# Patient Record
Sex: FEMALE | Race: BLACK OR AFRICAN AMERICAN | NOT HISPANIC OR LATINO | Employment: FULL TIME | URBAN - METROPOLITAN AREA
[De-identification: names, ages, dates, MRNs, and addresses within clinical notes are randomized per-mention and may not be internally consistent; named-entity substitution may affect disease eponyms.]

---

## 2019-08-15 ENCOUNTER — OFFICE VISIT (OUTPATIENT)
Dept: FAMILY MEDICINE CLINIC | Facility: CLINIC | Age: 49
End: 2019-08-15

## 2019-08-15 ENCOUNTER — APPOINTMENT (OUTPATIENT)
Dept: GENERAL RADIOLOGY | Facility: HOSPITAL | Age: 49
End: 2019-08-15

## 2019-08-15 VITALS
WEIGHT: 132 LBS | SYSTOLIC BLOOD PRESSURE: 100 MMHG | HEIGHT: 62 IN | OXYGEN SATURATION: 98 % | HEART RATE: 75 BPM | BODY MASS INDEX: 24.29 KG/M2 | DIASTOLIC BLOOD PRESSURE: 72 MMHG

## 2019-08-15 DIAGNOSIS — M54.50 ACUTE MIDLINE LOW BACK PAIN WITHOUT SCIATICA: Primary | ICD-10-CM

## 2019-08-15 DIAGNOSIS — Z00.00 PREVENTATIVE HEALTH CARE: ICD-10-CM

## 2019-08-15 DIAGNOSIS — S39.012A LUMBAR STRAIN, INITIAL ENCOUNTER: ICD-10-CM

## 2019-08-15 PROCEDURE — 99203 OFFICE O/P NEW LOW 30 MIN: CPT | Performed by: FAMILY MEDICINE

## 2019-08-15 RX ORDER — CYCLOBENZAPRINE HCL 10 MG
10 TABLET ORAL 3 TIMES DAILY PRN
Qty: 30 TABLET | Refills: 0 | Status: SHIPPED | OUTPATIENT
Start: 2019-08-15 | End: 2019-11-25

## 2019-08-15 NOTE — PROGRESS NOTES
Subjective   Flora Love is a 48 y.o. female.     Chief Complaint   Patient presents with   • Establish Care     back pain        History of Present Illness     Previous primary care: [place]    Chronic health conditions:  Chronic intermittent low back pain: She had scoliosis surgery back in 1985.  She periodically has issues due to this    Other physicians currently involved in patient's care:  She sees her gynecologist once per year, last Pap smear 1 year ago    Acute complaints:  Flora Love is a 48 y.o. female who presents today to [facility].  She was seen in the urgent care on 8/13/2019 for acute low back pain.  She was given a prescription for Flexeril and received an injection of steroid and the peroneal muscle that time.  She reports that she has improved significantly since that time.  She still has some pain, it is more located in the midline.    This patient is accompanied by their self who contributes to the history of their care.    The following portions of the patient's history were reviewed and updated as appropriate: allergies, current medications, past family history, past medical history, past social history, past surgical history and problem list.    Active Ambulatory Problems     Diagnosis Date Noted   • No Active Ambulatory Problems     Resolved Ambulatory Problems     Diagnosis Date Noted   • No Resolved Ambulatory Problems     Past Medical History:   Diagnosis Date   • Arthritis    • Scoliosis      Past Surgical History:   Procedure Laterality Date   • BACK SURGERY       Family History   Problem Relation Age of Onset   • Heart attack Mother    • Arthritis Father    • Hypertension Father    • Hyperlipidemia Father    • Stroke Father      Social History     Socioeconomic History   • Marital status: Single     Spouse name: Not on file   • Number of children: Not on file   • Years of education: Not on file   • Highest education level: Not on file   Tobacco Use   • Smoking  "status: Never Smoker   • Smokeless tobacco: Never Used   Substance and Sexual Activity   • Alcohol use: No     Frequency: Never   • Drug use: No         Review of Systems   Constitutional: Negative.    HENT: Negative.    Eyes: Negative.    Respiratory: Negative for cough, chest tightness, shortness of breath and wheezing.    Cardiovascular: Negative for chest pain and palpitations.   Gastrointestinal: Negative for abdominal distention, abdominal pain, constipation, diarrhea, nausea and vomiting.   Musculoskeletal: Positive for back pain. Negative for gait problem and joint swelling.   Skin: Negative for color change and wound.   Psychiatric/Behavioral: Negative for agitation and hallucinations.       Objective   Blood pressure 100/72, pulse 75, height 157.5 cm (62.01\"), weight 59.9 kg (132 lb), last menstrual period 08/06/2019, SpO2 98 %, not currently breastfeeding.  Nursing note reviewed  Physical Exam  Const: NAD, A&Ox4, Pleasant, Cooperative  Eyes: EOMI, no conjunctivitis  ENT: No nasal discharge present, neck supple  Cardiac: Regular rate and rhythm, no cyanosis  Resp: Respiratory rate within normal limits, no increased work of breathing, no audible wheezing or retractions noted  GI: No distention or ascites  MSK: Motor and sensation grossly intact in bilateral upper extremities.  There is very mild midline spinal tenderness at about L3.  There is no paraspinal muscle spasm or tenderness, no edema noted today.  Straight leg raise negative.  Reflexes brisk and symmetric.  Neurologic: CN II-XII grossly intact  Psych: Appropriate mood and behavior  Procedures  Assessment/Plan   Problem List Items Addressed This Visit     None      Visit Diagnoses     Acute midline low back pain without sciatica    -  Primary    Relevant Orders    XR Spine Lumbar 2 or 3 View    Ambulatory Referral to Physical Therapy Evaluate and treat    Lumbar strain, initial encounter        Relevant Medications    cyclobenzaprine (FLEXERIL) 10 " MG tablet    Preventative health care        Relevant Orders    Comprehensive Metabolic Panel    CBC & Differential    Hemoglobin A1c    Lipid Panel    Urinalysis With Microscopic If Indicated (No Culture) - Urine, Clean Catch    TSH      #1 acute on chronic low back pain  I like to get an x-ray today for a baseline  She should continue to do stretching and exercises at home, she states that she is not sure what she is able to do  Referral to physical therapy placed  Continue Flexeril as needed over the next couple of weeks    We will plan to obtain previous records both for chronic preventative care as well as those related to the current episode of care.  Any records that the patient brought with her today were reviewed personally by me during the visit today and will be scanned into the chart for posterity.    There are no Patient Instructions on file for this visit.    Return in about 3 months (around 11/15/2019) for Annual.    Ambulatory progress note signed and attested to by Eddie Marley D.O.

## 2019-11-25 ENCOUNTER — OFFICE VISIT (OUTPATIENT)
Dept: FAMILY MEDICINE CLINIC | Facility: CLINIC | Age: 49
End: 2019-11-25

## 2019-11-25 VITALS
SYSTOLIC BLOOD PRESSURE: 122 MMHG | DIASTOLIC BLOOD PRESSURE: 80 MMHG | HEIGHT: 62 IN | HEART RATE: 75 BPM | OXYGEN SATURATION: 98 % | WEIGHT: 140 LBS | BODY MASS INDEX: 25.76 KG/M2

## 2019-11-25 DIAGNOSIS — M25.50 POLYARTHRALGIA: ICD-10-CM

## 2019-11-25 DIAGNOSIS — Z00.00 PREVENTATIVE HEALTH CARE: Primary | ICD-10-CM

## 2019-11-25 PROCEDURE — 99396 PREV VISIT EST AGE 40-64: CPT | Performed by: FAMILY MEDICINE

## 2019-11-25 NOTE — PROGRESS NOTES
"Subjective   Flora Love is a 48 y.o. female.     Chief Complaint   Patient presents with   • Annual Exam       History of Present Illness     Flora Love presents today for   Chief Complaint   Patient presents with   • Annual Exam     She received her previous primary care in [place].  She established care here in August.  At that time she was having low back pain, which did well with Flexeril.  X-ray was ordered for baseline, but was not completed.  She was instructed to come back today for her annual exam, and lab orders were placed to be done prior to her appointment.  These have not been completed.  In terms of her chronic preventative care, she does see her gynecologist every year for a pelvic exam, she had a Pap smear completed last year.  She is generally healthy and feels well, she occasionally has arthralgias of multiple joints.  She had an episode of gout that was diagnosed a few years ago, she questions this because she does not \"have anything that would make her have gout.\"    This patient is accompanied by their self who contributes to the history of their care.    The following portions of the patient's history were reviewed and updated as appropriate: allergies, current medications, past family history, past medical history, past social history, past surgical history and problem list.    Active Ambulatory Problems     Diagnosis Date Noted   • Preventative health care 11/25/2019     Resolved Ambulatory Problems     Diagnosis Date Noted   • No Resolved Ambulatory Problems     Past Medical History:   Diagnosis Date   • Arthritis    • Scoliosis      Past Surgical History:   Procedure Laterality Date   • BACK SURGERY       Family History   Problem Relation Age of Onset   • Heart attack Mother    • Arthritis Father    • Hypertension Father    • Hyperlipidemia Father    • Stroke Father      Social History     Socioeconomic History   • Marital status: Single     Spouse name: Not on file   • " "Number of children: Not on file   • Years of education: Not on file   • Highest education level: Not on file   Tobacco Use   • Smoking status: Never Smoker   • Smokeless tobacco: Never Used   Substance and Sexual Activity   • Alcohol use: No     Frequency: Never   • Drug use: No       Review of Systems  Review of Systems -  General ROS: negative for - chills, fever or night sweats  Cardiovascular ROS: no chest pain or dyspnea on exertion  Gastrointestinal ROS: no abdominal pain, change in bowel habits, or black or bloody stools  Genito-Urinary ROS: no dysuria, trouble voiding, or hematuria  MSK ROS: Mild polyarthralgia    Objective   Blood pressure 122/80, pulse 75, height 157.5 cm (62.01\"), weight 63.5 kg (140 lb), SpO2 98 %, not currently breastfeeding.  Nursing note reviewed  Physical Exam  Const: NAD, A&Ox4, Pleasant, Cooperative  Eyes: EOMI, no conjunctivitis  ENT: No nasal discharge present, neck supple  Cardiac: Regular rate and rhythm, no cyanosis  Resp: Respiratory rate within normal limits, no increased work of breathing, no audible wheezing or retractions noted  GI: No distention or ascites  MSK: Motor and sensation grossly intact in bilateral upper extremities.  Reflexes brisk and symmetric.  No arthritis of elbows, shoulders, knees, hands.  Neurologic: CN II-XII grossly intact  Psych: Appropriate mood and behavior.  Skin: Warm, dry  Procedures  Assessment/Plan   Problem List Items Addressed This Visit        Other    Preventative health care - Primary    Overview     Health maintenance:  Hypertension screening: Normotensive today  Lipid screening: Labs ordered  Diabetic screening: Previously diagnosed per patient, but she is not so sure that she actually has diabetes.  Labs ordered today.  Colonoscopy: Normal risk, should start after age 50 every 10 years.  Smoking cessation: Never a smoker  Depression screening: PHQ 2-  Pap smear: Completed last year at her gynecologist  Osteoporosis screening: Normal " risk, not currently indicated  Fall Risk Screening:   Vaccinations: See chart, up to date           Other Visit Diagnoses     Polyarthralgia        Relevant Orders    C-reactive Protein    Uric Acid        See patient diagnoses and orders along with patient instructions for assessment, plan, and changes to care for patient.    The preventative exam has been reviewed in detail.  The patient has been fully counseled on preventative guidelines for vaccines, cancer screenings, and other health maintenance needs. The patient was counseled on maintaining a lifestyle to promote good health and to minimize chronic diseases.  The patient has been assisted with scheduling healthcare procedures for the coming year and given a written document outlining these recommendations. Age-appropriate screening measures have been ordered for the patient today as indicated above.    Patient Instructions   1.  Continue medications and supplements - as listed.    2.  Continue well-balanced diet - low in calories and low in added sugar.  -Plant-based diets have the best evidence for decreasing inflammation and chronic pain, as well as reducing the risk of heart disease and dementia.    3.  Maintain a routine exercise program - 30 minutes at least 3 days every week.  This is important even if you are active at your job.    4.  A letter will be sent with your test results or they will be made available via [employer].  If you have not heard about your results within 10 days for chronic and routine labs, or 48 hours for acute labs, please call the office.    5.  If you would like to have your labs completed prior to your next visit to be discussed at your appointment, please call the office 1 to 2 weeks before your scheduled visit to request that lab orders be placed.      Return in about 1 year (around 11/25/2020) for (fasting blood work 1 week prior to appt).    Ambulatory progress note signed and attested to by Eddie Marley D.O.

## 2019-11-25 NOTE — PATIENT INSTRUCTIONS
1.  Continue medications and supplements - as listed.    2.  Continue well-balanced diet - low in calories and low in added sugar.  -Plant-based diets have the best evidence for decreasing inflammation and chronic pain, as well as reducing the risk of heart disease and dementia.    3.  Maintain a routine exercise program - 30 minutes at least 3 days every week.  This is important even if you are active at your job.    4.  A letter will be sent with your test results or they will be made available via [employer].  If you have not heard about your results within 10 days for chronic and routine labs, or 48 hours for acute labs, please call the office.    5.  If you would like to have your labs completed prior to your next visit to be discussed at your appointment, please call the office 1 to 2 weeks before your scheduled visit to request that lab orders be placed.

## 2019-12-02 ENCOUNTER — LAB (OUTPATIENT)
Dept: LAB | Facility: HOSPITAL | Age: 49
End: 2019-12-02

## 2019-12-02 DIAGNOSIS — Z00.00 PREVENTATIVE HEALTH CARE: ICD-10-CM

## 2019-12-02 DIAGNOSIS — M25.50 POLYARTHRALGIA: ICD-10-CM

## 2019-12-02 LAB
BILIRUB UR QL STRIP: NEGATIVE
CLARITY UR: CLEAR
COLOR UR: YELLOW
GLUCOSE UR STRIP-MCNC: NEGATIVE MG/DL
HBA1C MFR BLD: 6.3 % (ref 4.8–5.6)
HGB UR QL STRIP.AUTO: NEGATIVE
KETONES UR QL STRIP: ABNORMAL
LEUKOCYTE ESTERASE UR QL STRIP.AUTO: NEGATIVE
NITRITE UR QL STRIP: NEGATIVE
PH UR STRIP.AUTO: 5.5 [PH] (ref 5–8)
PROT UR QL STRIP: NEGATIVE
SP GR UR STRIP: 1.02 (ref 1–1.03)
UROBILINOGEN UR QL STRIP: ABNORMAL

## 2019-12-02 PROCEDURE — 81003 URINALYSIS AUTO W/O SCOPE: CPT

## 2019-12-02 PROCEDURE — 83036 HEMOGLOBIN GLYCOSYLATED A1C: CPT

## 2019-12-02 PROCEDURE — 86140 C-REACTIVE PROTEIN: CPT

## 2019-12-02 PROCEDURE — 85025 COMPLETE CBC W/AUTO DIFF WBC: CPT

## 2019-12-02 PROCEDURE — 84550 ASSAY OF BLOOD/URIC ACID: CPT

## 2019-12-02 PROCEDURE — 80053 COMPREHEN METABOLIC PANEL: CPT

## 2019-12-02 PROCEDURE — 84443 ASSAY THYROID STIM HORMONE: CPT

## 2019-12-02 PROCEDURE — 80061 LIPID PANEL: CPT

## 2019-12-03 LAB
ALBUMIN SERPL-MCNC: 4.7 G/DL (ref 3.5–5.2)
ALBUMIN/GLOB SERPL: 1.3 G/DL
ALP SERPL-CCNC: 58 U/L (ref 39–117)
ALT SERPL W P-5'-P-CCNC: 17 U/L (ref 1–33)
ANION GAP SERPL CALCULATED.3IONS-SCNC: 11.7 MMOL/L (ref 5–15)
AST SERPL-CCNC: 19 U/L (ref 1–32)
BASOPHILS # BLD AUTO: 0.01 10*3/MM3 (ref 0–0.2)
BASOPHILS NFR BLD AUTO: 0.1 % (ref 0–1.5)
BILIRUB SERPL-MCNC: 1 MG/DL (ref 0.2–1.2)
BUN BLD-MCNC: 10 MG/DL (ref 6–20)
BUN/CREAT SERPL: 16.4 (ref 7–25)
CALCIUM SPEC-SCNC: 9.4 MG/DL (ref 8.6–10.5)
CHLORIDE SERPL-SCNC: 100 MMOL/L (ref 98–107)
CHOLEST SERPL-MCNC: 119 MG/DL (ref 0–200)
CO2 SERPL-SCNC: 27.3 MMOL/L (ref 22–29)
CREAT BLD-MCNC: 0.61 MG/DL (ref 0.57–1)
CRP SERPL-MCNC: 0.5 MG/DL (ref 0–0.5)
DEPRECATED RDW RBC AUTO: 44.3 FL (ref 37–54)
EOSINOPHIL # BLD AUTO: 0.2 10*3/MM3 (ref 0–0.4)
EOSINOPHIL NFR BLD AUTO: 2.2 % (ref 0.3–6.2)
ERYTHROCYTE [DISTWIDTH] IN BLOOD BY AUTOMATED COUNT: 13 % (ref 12.3–15.4)
GFR SERPL CREATININE-BSD FRML MDRD: 127 ML/MIN/1.73
GLOBULIN UR ELPH-MCNC: 3.6 GM/DL
GLUCOSE BLD-MCNC: 82 MG/DL (ref 65–99)
HCT VFR BLD AUTO: 36.1 % (ref 34–46.6)
HDLC SERPL-MCNC: 68 MG/DL (ref 40–60)
HGB BLD-MCNC: 11.9 G/DL (ref 12–15.9)
IMM GRANULOCYTES # BLD AUTO: 0.02 10*3/MM3 (ref 0–0.05)
IMM GRANULOCYTES NFR BLD AUTO: 0.2 % (ref 0–0.5)
LDLC SERPL CALC-MCNC: 41 MG/DL (ref 0–100)
LDLC/HDLC SERPL: 0.6 {RATIO}
LYMPHOCYTES # BLD AUTO: 2.04 10*3/MM3 (ref 0.7–3.1)
LYMPHOCYTES NFR BLD AUTO: 22.2 % (ref 19.6–45.3)
MCH RBC QN AUTO: 30.6 PG (ref 26.6–33)
MCHC RBC AUTO-ENTMCNC: 33 G/DL (ref 31.5–35.7)
MCV RBC AUTO: 92.8 FL (ref 79–97)
MONOCYTES # BLD AUTO: 0.74 10*3/MM3 (ref 0.1–0.9)
MONOCYTES NFR BLD AUTO: 8.1 % (ref 5–12)
NEUTROPHILS # BLD AUTO: 6.17 10*3/MM3 (ref 1.7–7)
NEUTROPHILS NFR BLD AUTO: 67.2 % (ref 42.7–76)
NRBC BLD AUTO-RTO: 0 /100 WBC (ref 0–0.2)
PLATELET # BLD AUTO: 270 10*3/MM3 (ref 140–450)
PMV BLD AUTO: 10.9 FL (ref 6–12)
POTASSIUM BLD-SCNC: 4.1 MMOL/L (ref 3.5–5.2)
PROT SERPL-MCNC: 8.3 G/DL (ref 6–8.5)
RBC # BLD AUTO: 3.89 10*6/MM3 (ref 3.77–5.28)
SODIUM BLD-SCNC: 139 MMOL/L (ref 136–145)
TRIGL SERPL-MCNC: 52 MG/DL (ref 0–150)
TSH SERPL DL<=0.05 MIU/L-ACNC: 0.56 UIU/ML (ref 0.27–4.2)
URATE SERPL-MCNC: 3.8 MG/DL (ref 2.4–5.7)
VLDLC SERPL-MCNC: 10.4 MG/DL (ref 5–40)
WBC NRBC COR # BLD: 9.18 10*3/MM3 (ref 3.4–10.8)

## 2023-04-13 ENCOUNTER — APPOINTMENT (OUTPATIENT)
Dept: GENERAL RADIOLOGY | Facility: HOSPITAL | Age: 53
End: 2023-04-13
Payer: COMMERCIAL

## 2023-04-13 ENCOUNTER — HOSPITAL ENCOUNTER (EMERGENCY)
Facility: HOSPITAL | Age: 53
Discharge: HOME | End: 2023-04-13
Attending: EMERGENCY MEDICINE | Admitting: EMERGENCY MEDICINE
Payer: COMMERCIAL

## 2023-04-13 VITALS
BODY MASS INDEX: 25.03 KG/M2 | HEIGHT: 62 IN | RESPIRATION RATE: 18 BRPM | DIASTOLIC BLOOD PRESSURE: 103 MMHG | OXYGEN SATURATION: 100 % | SYSTOLIC BLOOD PRESSURE: 154 MMHG | TEMPERATURE: 98.2 F | WEIGHT: 136 LBS | HEART RATE: 69 BPM

## 2023-04-13 DIAGNOSIS — M54.17 LUMBOSACRAL RADICULOPATHY: Primary | ICD-10-CM

## 2023-04-13 PROCEDURE — 99283 EMERGENCY DEPT VISIT LOW MDM: CPT

## 2023-04-13 PROCEDURE — 72170 X-RAY EXAM OF PELVIS: CPT

## 2023-04-13 PROCEDURE — 72110 X-RAY EXAM L-2 SPINE 4/>VWS: CPT

## 2023-04-13 PROCEDURE — 96372 THER/PROPH/DIAG INJ SC/IM: CPT

## 2023-04-13 PROCEDURE — 25010000002 KETOROLAC TROMETHAMINE PER 15 MG: Performed by: PHYSICIAN ASSISTANT

## 2023-04-13 RX ORDER — PREDNISONE 20 MG/1
TABLET ORAL
Qty: 19 TABLET | Refills: 0 | Status: SHIPPED | OUTPATIENT
Start: 2023-04-13 | End: 2023-04-20

## 2023-04-13 RX ORDER — METHOCARBAMOL 750 MG/1
TABLET, FILM COATED ORAL
Qty: 30 TABLET | Refills: 0 | Status: SHIPPED | OUTPATIENT
Start: 2023-04-13

## 2023-04-13 RX ORDER — KETOROLAC TROMETHAMINE 30 MG/ML
30 INJECTION, SOLUTION INTRAMUSCULAR; INTRAVENOUS ONCE
Status: COMPLETED | OUTPATIENT
Start: 2023-04-13 | End: 2023-04-13

## 2023-04-13 RX ADMIN — KETOROLAC TROMETHAMINE 30 MG: 30 INJECTION, SOLUTION INTRAMUSCULAR at 08:03

## 2023-04-13 NOTE — ED TRIAGE NOTES
Pt complains of right hip pain intermittently for the last week. States that some times both of her hips hurt. Reports no known injury. Pt is ambulatory at triage.     All appropriate PPE worn during entire encounter with patient.

## 2023-04-13 NOTE — ED PROVIDER NOTES
EMERGENCY DEPARTMENT ENCOUNTER    Room Number:  02/02  Date of encounter:  4/13/2023  PCP: Provider, No Known  Patient Care Team:  Provider, No Known as PCP - General   Independent Historians: Patient    HPI:  Chief Complaint: Hip pain  A complete HPI/ROS/PMH/PSH/SH/FH are unobtainable due to: None    Chronic or social conditions impacting patient care (social determinants of health): None    Context: Flora Love is a 52 y.o. female who presents to the ED c/o bilateral hip pain right worse than left has been going on for a while but has been worse over the past week.  Patient states she does a lot of walking at work and a lot of bending and lifting.  She states she has a muscle relaxer she has tried at home without relief.  She denies any falls or trauma.  She does report some mild numbness to her right foot at times.  She denies any saddle anesthesia or bowel or bladder incontinence.    Review of prior external notes (non-ED): Office visit with Dr. Marley on 11/25/2019.  This was for annual exam.  Reported low back pain at that time and did well with Flexeril.    Review of prior external test results outside of this encounter: Labs from 12/2/2019 show normal renal function, normal electrolytes, normal LFTs.  Hemoglobin A1c of 6.3.  Mild anemia with hemoglobin of 11.9.    PAST MEDICAL HISTORY  Active Ambulatory Problems     Diagnosis Date Noted   • Preventative health care 11/25/2019     Resolved Ambulatory Problems     Diagnosis Date Noted   • No Resolved Ambulatory Problems     Past Medical History:   Diagnosis Date   • Arthritis    • Scoliosis        The patient qualifies to receive the vaccine, but they have not yet received it.    PAST SURGICAL HISTORY  Past Surgical History:   Procedure Laterality Date   • BACK SURGERY           FAMILY HISTORY  Family History   Problem Relation Age of Onset   • Heart attack Mother    • Arthritis Father    • Hypertension Father    • Hyperlipidemia Father    • Stroke  Father          SOCIAL HISTORY  Social History     Socioeconomic History   • Marital status: Single   Tobacco Use   • Smoking status: Never   • Smokeless tobacco: Never   Substance and Sexual Activity   • Alcohol use: No   • Drug use: No         ALLERGIES  Patient has no known allergies.        REVIEW OF SYSTEMS  Review of Systems   Constitutional: Negative for fever.   Gastrointestinal: Negative for nausea and vomiting.   Genitourinary: Negative for decreased urine volume, difficulty urinating and dysuria.   Musculoskeletal: Positive for back pain and myalgias. Negative for neck pain.   Neurological: Negative for weakness.        All systems reviewed and negative except for those discussed in HPI.       PHYSICAL EXAM    I have reviewed the triage vital signs and nursing notes.    ED Triage Vitals   Temp Heart Rate Resp BP SpO2   04/13/23 0725 04/13/23 0725 04/13/23 0725 04/13/23 0732 04/13/23 0725   98.2 °F (36.8 °C) 94 16 157/98 100 %      Temp src Heart Rate Source Patient Position BP Location FiO2 (%)   04/13/23 0725 04/13/23 0725 04/13/23 0732 04/13/23 0732 --   Tympanic Monitor Sitting Right arm        Physical Exam  GENERAL: alert, no acute distress  SKIN: Warm, dry  HENT: Normocephalic, atraumatic  EYES: no scleral icterus  CV: regular rhythm, regular rate  RESPIRATORY: normal effort, lungs clear  ABDOMEN: soft, nontender, nondistended  MUSCULOSKELETAL: pain to lumbosacral region extending into bilateral buttocks and posterior thighs, no midline lumbar ttp, no calf tenderness, no edema, normal strength  NEURO: alert, moves all extremities, follows commands          LAB RESULTS  No results found for this or any previous visit (from the past 24 hour(s)).    Ordered the above labs and independently reviewed and interpreted the results.        RADIOLOGY  XR Spine Lumbar Complete 4+VW, XR Pelvis 1 or 2 View    Result Date: 4/13/2023  XR PELVIS 1 OR 2 VW-, XR SPINE LUMBAR COMPLETE 4+VW-  INDICATIONS: Pain   TECHNIQUE: Frontal view the pelvis, 8 views of the lumbar spine  COMPARISON: None available  FINDINGS:  Lumbar spine: Thoracolumbar spinal stabilization bar is partly included. Mild thoracolumbar levoscoliosis is apparent. Vertebral body heights appear preserved. No acute fracture is identified. Slight/borderline retrolisthesis of L3 on L4. Multilevel endplate spurring, disc space narrowing, facet arthropathy are evident.  Pelvis: The pelvic ring appears intact. No dislocation or obvious displaced fracture. Small degenerative changes seen at the symphysis pubis.  Follow-up/further evaluation can be obtained as indications persist.       As described.  This report was finalized on 4/13/2023 8:45 AM by Dr. Zaki Morgan M.D.        I ordered the above noted radiological studies. Independently reviewed and interpreted by me.  See dictation for official radiology interpretation.      PROCEDURES    Procedures      MEDICATIONS GIVEN IN ER    Medications   ketorolac (TORADOL) injection 30 mg (30 mg Intramuscular Given 4/13/23 0803)         PROGRESS, DATA ANALYSIS, CONSULTS, AND MEDICAL DECISION MAKING    All labs have been independently reviewed and interpreted by me.  All radiology studies have been independently reviewed and interpreted by me and discussed with radiologist dictating the report.   EKG's independently reviewed and interpreted by me.  Discussion below represents my analysis of pertinent findings related to patient's condition, differential diagnosis, treatment plan and final disposition.    My differential diagnosis for back pain includes but is not limited to:  Musculoskeletal strain, contusion, retroperitoneal hematoma, disc protrusion, vertebral fracture, transverse process fracture, rib fracture, facet syndrome, sacroiliac joint strain, sciatica, renal injury, splenic injury, pancreatic injury, osteoarthritis, lumbar spondylosis, spinal stenosis, ankylosing spondylitis, sacroiliac joint  inflammation, pancreatitis, perforated peptic ulcer, diverticulitis, epidural abscess, osteomyelitis, retroperitoneal abscess, pyelonephritis, pneumonia, subphrenic abscess, tuberculosis, neurofibroma, meningioma, multiple myeloma, lymphoma, metastatic cancer, primary cancer, AAA, aortic dissection, spinal ischemia, referred pain, ureterolithiasis       Suspect radiculopathy/sciatica.  Feels improved after Toradol in ER.  X-rays negative for acute fracture or dislocation.  No red flag symptoms such as saddle anesthesia, incontinence.  Will start on prednisone taper and prescribe alternative muscle relaxer.  Will provide follow-up with PCP and spine.        PPE: Patient was placed in face mask in first look. Patient was wearing facemask when I entered the room and throughout our encounter. I wore full protective equipment throughout this patient encounter including a face mask, and gloves. Hand hygiene was performed before donning protective equipment and after removal when leaving the room.        AS OF 16:44 EDT VITALS:    BP - (!) 154/103  HR - 69  TEMP - 98.2 °F (36.8 °C) (Tympanic)  O2 SATS - 100%        DIAGNOSIS  Final diagnoses:   Lumbosacral radiculopathy         DISPOSITION  ED Disposition     ED Disposition   Discharge    Condition   Stable    Comment   --                Note Disclaimer: At [facility], we believe that sharing information builds trust and better relationships. You are receiving this note because you recently visited [facility]. It is possible you will see health information before a provider has talked with you about it. This kind of information can be easy to misunderstand. To help you fully understand what it means for your health, we urge you to discuss this note with your provider.       Pamela Foster PA  04/13/23 0114

## 2023-04-13 NOTE — Clinical Note
[facility] EMERGENCY DEPARTMENT  [address] [facility] [address]  Phone: [phone]    Flora Love was seen and treated in our emergency department on 4/13/2023.  She may return to work on 04/17/2023.         Thank you for choosing [facility].    Pamela Foster PA

## 2023-04-20 ENCOUNTER — OFFICE VISIT (OUTPATIENT)
Dept: FAMILY MEDICINE CLINIC | Facility: CLINIC | Age: 53
End: 2023-04-20
Payer: COMMERCIAL

## 2023-04-20 VITALS
WEIGHT: 142 LBS | HEIGHT: 62 IN | RESPIRATION RATE: 18 BRPM | SYSTOLIC BLOOD PRESSURE: 158 MMHG | BODY MASS INDEX: 26.13 KG/M2 | DIASTOLIC BLOOD PRESSURE: 82 MMHG | HEART RATE: 67 BPM | OXYGEN SATURATION: 97 %

## 2023-04-20 DIAGNOSIS — Z12.11 SCREEN FOR COLON CANCER: ICD-10-CM

## 2023-04-20 DIAGNOSIS — Z11.59 ENCOUNTER FOR HEPATITIS C SCREENING TEST FOR LOW RISK PATIENT: ICD-10-CM

## 2023-04-20 DIAGNOSIS — Z87.898 HISTORY OF PREDIABETES: ICD-10-CM

## 2023-04-20 DIAGNOSIS — Z00.00 PREVENTATIVE HEALTH CARE: Primary | ICD-10-CM

## 2023-04-20 DIAGNOSIS — M54.42 CHRONIC LEFT-SIDED LOW BACK PAIN WITH LEFT-SIDED SCIATICA: ICD-10-CM

## 2023-04-20 DIAGNOSIS — Z12.31 SCREENING MAMMOGRAM FOR BREAST CANCER: ICD-10-CM

## 2023-04-20 DIAGNOSIS — Z13.228 SCREENING FOR METABOLIC DISORDER: ICD-10-CM

## 2023-04-20 DIAGNOSIS — Z13.220 SCREENING, LIPID: ICD-10-CM

## 2023-04-20 DIAGNOSIS — G89.29 CHRONIC LEFT-SIDED LOW BACK PAIN WITH LEFT-SIDED SCIATICA: ICD-10-CM

## 2023-04-20 NOTE — PROGRESS NOTES
"Subjective   Patient ID: Flora Love is a 52 y.o. female is being seen for consultation today at the request of Tony Dalton MD diagnosis  Lumbosacral radiculopathy.  XR Lumbar Spine Complete 4+ vw    Today, patient reports she has had bilateral low back pain for quite some time.  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  The morning she presented to the ER, the pain was worse on the right side and she had some radiation posteriorly down the right thigh.  She denies any numbness, tingling or weakness.  She also denies any balance, gait issues, bowel or bladder issues.    She has been taking muscle relaxers and completed a Dosepak of steroids.  She states that she has also been taking a pain medication, but does not know the name.  She has a hard time with NSAIDs due to \"stomach problems.\"    She presents unaccompanied.    History of Present Illness         /64   Pulse 80   Temp 97.5 °F (36.4 °C)   Wt 64.4 kg (142 lb)   SpO2 98%   BMI 25.97 kg/m²       The following portions of the patient's history were reviewed and updated as appropriate: allergies, current medications, past family history, past medical history, past social history, past surgical history and problem list.    Review of Systems   Constitutional: Positive for activity change. Negative for chills and fever.   Respiratory: Negative for cough, chest tightness and shortness of breath.    Cardiovascular: Negative for chest pain, palpitations and leg swelling.   Gastrointestinal: Negative for abdominal pain and constipation.   Genitourinary: Negative for difficulty urinating and enuresis.   Musculoskeletal: Positive for back pain. Negative for gait problem.   Skin: Negative for rash.   Allergic/Immunologic: Negative.    Neurological: Positive for numbness (or tingling). Negative for weakness.   Hematological: Does not bruise/bleed easily. "   Psychiatric/Behavioral: Positive for sleep disturbance.           Objective     Vitals:    04/25/23 1111   BP: 108/64   Pulse: 80   Temp: 97.5 °F (36.4 °C)   SpO2: 98%   Weight: 64.4 kg (142 lb)     Body mass index is 25.97 kg/m².    Tobacco Use: Low Risk    • Smoking Tobacco Use: Never   • Smokeless Tobacco Use: Never   • Passive Exposure: Not on file          Physical Exam  Vitals reviewed.   Constitutional:       Appearance: Normal appearance.   HENT:      Head: Atraumatic.   Eyes:      Extraocular Movements: Extraocular movements intact.      Comments: Corrective lenses   Musculoskeletal:         General: Tenderness (Very tender to minimal palpation bilateral low back, just above the waistline) present. No swelling, deformity or signs of injury. Normal range of motion.      Cervical back: Normal range of motion and neck supple.      Comments: Strength is equal and intact bilateral lower extremities, normal muscle bulk and tone   Skin:     General: Skin is warm and dry.   Neurological:      Mental Status: She is alert and oriented to person, place, and time.      GCS: GCS eye subscore is 4. GCS verbal subscore is 5. GCS motor subscore is 6.      Sensory: Sensation is intact.      Motor: Motor function is intact. No weakness.      Coordination: Coordination normal.      Gait: Gait is intact. Gait normal.      Deep Tendon Reflexes: Reflexes normal.      Reflex Scores:       Patellar reflexes are 2+ on the right side and 2+ on the left side.       Achilles reflexes are 2+ on the right side and 2+ on the left side.     Comments: Negative straight leg raise  Gait is stable and upright, nonantalgic  Able to stand on heels and toes   Psychiatric:         Mood and Affect: Mood normal.       Neurologic Exam     Mental Status   Oriented to person, place, and time.     Gait, Coordination, and Reflexes     Gait  Gait: normal    Reflexes   Right patellar: 2+  Left patellar: 2+  Right achilles: 2+  Left achilles:  2+          Assessment & Plan   Independent Review of Radiographic Studies:      I personally reviewed the images from the following studies.    Lumbar x-rays from [facility] dated April 13, 2023 showed mostly stable findings.  A spinal stabilization bar is seen in the thoracolumbar region.  She has no acute fractures.  There is slight retrolisthesis of L3 on L4.  No acute abnormalities.    Medical Decision Making:    She presents the office today for further evaluation of acute on chronic low back pain.  Exam as noted above, no red flags.  The patient's x-rays were reviewed with the patient, which revealed no acute findings.  She has no radicular symptomatology.  She is extremely tender to very minimal palpation the bilateral low back.  I feel that her pain is more musculoskeletal in origin.  I referred her to physical therapy.  Have also refilled her prescription for muscle relaxers.  Regarding the use of NSAIDs, I encouraged her to take them on a full belly with food.  If she still is a hard time taking this medication she should try Tylenol.  Also encouraged looking for a different position that did not require as much heavy bending, twisting and lifting.  She states that she is in the process of trying to get a different position.  She has no restrictions from our standpoint today.  We will see her back following a trial of PT.    Plan: Referral to PT, muscle relaxers to be taken as directed, return to office in 2 months       Diagnoses and all orders for this visit:    1. Chronic bilateral low back pain without sciatica (Primary)  -     Ambulatory Referral to Physical Therapy Evaluate and treat; Stretching, ROM, Strengthening; Full weight bearing    Other orders  -     methocarbamol (ROBAXIN) 750 MG tablet; Take 1-2 tabs PO up to 3 times daily as needed for pain.  Dispense: 45 tablet; Refill: 1      Return in about 2 months (around 6/26/2023).

## 2023-04-20 NOTE — PROGRESS NOTES
Subjective   Flora Love is a 52 y.o. female.     History of Present Illness   New pt here estab primary care. She has not had PCP , labs, annual or any screening.     H.o scoliosis, surgery at 14 for rods.   Now with bilat hip pain and low back pain, uses patches/topoical rubs, worse w cold weather.  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 Multilevel endplate spurring, disc space  narrowing, facet arthropathy are evident), discharged on methocarbamol 750  and prednisone taper  She has upcoming appt 4/25 neurosurg NP      The following portions of the patient's history were reviewed and updated as appropriate: allergies, current medications, past family history, past medical history, past social history, past surgical history and problem list.    Review of Systems   Constitutional: Positive for activity change. Negative for fatigue, unexpected weight gain and unexpected weight loss.   HENT: Negative for congestion and postnasal drip.         Dental exam is due      Eyes: Negative for blurred vision and double vision.        Glasses, eye exam is utd     Respiratory: Negative for cough, chest tightness, shortness of breath and wheezing.    Cardiovascular: Negative for chest pain, palpitations and leg swelling.   Gastrointestinal: Positive for GERD (occasional ). Negative for abdominal pain, blood in stool, constipation, diarrhea, nausea and vomiting.   Endocrine: Negative for cold intolerance, heat intolerance, polydipsia, polyphagia and polyuria.   Genitourinary: Negative for breast lump, breast pain, dysuria, frequency and menstrual problem.   Musculoskeletal: Positive for arthralgias and back pain.        Right hip   Skin: Negative for rash.   Allergic/Immunologic: Positive for environmental allergies.   Neurological: Negative for dizziness, seizures, syncope and headache.   Hematological: Does not bruise/bleed  easily.   Psychiatric/Behavioral: Negative for sleep disturbance, suicidal ideas and depressed mood. The patient is not nervous/anxious.        Objective   Physical Exam  Vitals reviewed.   Constitutional:       Appearance: Normal appearance. She is normal weight.   HENT:      Head: Normocephalic.      Right Ear: Tympanic membrane normal.      Left Ear: Tympanic membrane normal.      Nose: Nose normal.      Mouth/Throat:      Mouth: Mucous membranes are moist.   Eyes:      Pupils: Pupils are equal, round, and reactive to light.   Cardiovascular:      Rate and Rhythm: Normal rate and regular rhythm.      Pulses: Normal pulses.      Heart sounds: Normal heart sounds.   Pulmonary:      Effort: Pulmonary effort is normal.      Breath sounds: Normal breath sounds.   Abdominal:      General: Abdomen is flat. Bowel sounds are normal.      Palpations: Abdomen is soft.   Musculoskeletal:         General: Normal range of motion.      Cervical back: Normal range of motion.      Lumbar back: Tenderness present.        Back:       Right hip: Tenderness present.      Left hip: Tenderness present.      Comments: Scarline, R scoliosis      Skin:     General: Skin is warm.   Neurological:      General: No focal deficit present.      Mental Status: She is alert.   Psychiatric:         Mood and Affect: Mood normal.         Vitals:    04/20/23 1406   BP: 158/82   Pulse: 67   Resp: 18   SpO2: 97%     Body mass index is 25.97 kg/m².    Procedures    Assessment & Plan   Problems Addressed this Visit        Health Encounters    Preventative health care - Primary   Other Visit Diagnoses     Chronic left-sided low back pain with left-sided sciatica        Screening for metabolic disorder        Relevant Orders    CBC & Differential    Comprehensive metabolic panel    Hemoglobin A1c    Screening, lipid        Relevant Orders    Lipid panel    Encounter for hepatitis C screening test for low risk patient        Relevant Orders    Hepatitis C  Antibody    Screening mammogram for breast cancer        Relevant Orders    Mammo Screening Digital Tomosynthesis Bilateral With CAD    Screen for colon cancer        Relevant Orders    Cologuard - Stool, Per Rectum    History of prediabetes        Relevant Orders    Hemoglobin A1c      Diagnoses       Codes Comments    Preventative health care    -  Primary ICD-10-CM: Z00.00  ICD-9-CM: V70.0     Chronic left-sided low back pain with left-sided sciatica     ICD-10-CM: M54.42, G89.29  ICD-9-CM: 724.2, 724.3, 338.29     Screening for metabolic disorder     ICD-10-CM: Z13.228  ICD-9-CM: V77.99     Screening, lipid     ICD-10-CM: Z13.220  ICD-9-CM: V77.91     Encounter for hepatitis C screening test for low risk patient     ICD-10-CM: Z11.59  ICD-9-CM: V73.89     Screening mammogram for breast cancer     ICD-10-CM: Z12.31  ICD-9-CM: V76.12     Screen for colon cancer     ICD-10-CM: Z12.11  ICD-9-CM: V76.51     History of prediabetes     ICD-10-CM: Z87.898  ICD-9-CM: V12.29         Orders Placed This Encounter   Procedures   • Mammo Screening Digital Tomosynthesis Bilateral With CAD     Standing Status:   Future     Standing Expiration Date:   4/20/2024     Order Specific Question:   Reason for Exam:     Answer:   breast cancer screen   • Cologuard - Stool, Per Rectum     Standing Status:   Future     Standing Expiration Date:   4/20/2024     Order Specific Question:   Release to patient     Answer:   Routine Release   • Comprehensive metabolic panel     Order Specific Question:   Release to patient     Answer:   Routine Release   • Hemoglobin A1c     Order Specific Question:   Release to patient     Answer:   Routine Release   • Lipid panel     Order Specific Question:   Release to patient     Answer:   Routine Release   • Hepatitis C Antibody     Order Specific Question:   Release to patient     Answer:   Routine Release   • CBC & Differential     Preventative care- Follow heart healthy diet, drink water, walk daily.  Wear seatbelts, wear helmets, wear sunscreens. Follow CDC guidelines for covid pandemic.     Low back pain/hip pain- awaiting neurosurg appt, cw robaxin 750mg 1-2 tab tid prn.      additional time on acute concners , chronic illness mgmnt > 21 min  Education provided in AVS   No follow-ups on file.

## 2023-04-21 LAB
ALBUMIN SERPL-MCNC: 4.3 G/DL (ref 3.8–4.9)
ALBUMIN/GLOB SERPL: 1.5 {RATIO} (ref 1.2–2.2)
ALP SERPL-CCNC: 74 IU/L (ref 44–121)
ALT SERPL-CCNC: 20 IU/L (ref 0–32)
AST SERPL-CCNC: 13 IU/L (ref 0–40)
BASOPHILS # BLD AUTO: 0 X10E3/UL (ref 0–0.2)
BASOPHILS NFR BLD AUTO: 0 %
BILIRUB SERPL-MCNC: 0.6 MG/DL (ref 0–1.2)
BUN SERPL-MCNC: 14 MG/DL (ref 6–24)
BUN/CREAT SERPL: 19 (ref 9–23)
CALCIUM SERPL-MCNC: 9.4 MG/DL (ref 8.7–10.2)
CHLORIDE SERPL-SCNC: 102 MMOL/L (ref 96–106)
CHOLEST SERPL-MCNC: 131 MG/DL (ref 100–199)
CO2 SERPL-SCNC: 22 MMOL/L (ref 20–29)
CREAT SERPL-MCNC: 0.75 MG/DL (ref 0.57–1)
EGFRCR SERPLBLD CKD-EPI 2021: 96 ML/MIN/1.73
EOSINOPHIL # BLD AUTO: 0 X10E3/UL (ref 0–0.4)
EOSINOPHIL NFR BLD AUTO: 0 %
ERYTHROCYTE [DISTWIDTH] IN BLOOD BY AUTOMATED COUNT: 13.2 % (ref 11.7–15.4)
GLOBULIN SER CALC-MCNC: 2.9 G/DL (ref 1.5–4.5)
GLUCOSE SERPL-MCNC: 165 MG/DL (ref 70–99)
HBA1C MFR BLD: 6.5 % (ref 4.8–5.6)
HCT VFR BLD AUTO: 35.7 % (ref 34–46.6)
HCV IGG SERPL QL IA: NON REACTIVE
HDLC SERPL-MCNC: 74 MG/DL
HGB BLD-MCNC: 11.9 G/DL (ref 11.1–15.9)
IMM GRANULOCYTES # BLD AUTO: 0 X10E3/UL (ref 0–0.1)
IMM GRANULOCYTES NFR BLD AUTO: 0 %
LDLC SERPL CALC-MCNC: 46 MG/DL (ref 0–99)
LYMPHOCYTES # BLD AUTO: 1.3 X10E3/UL (ref 0.7–3.1)
LYMPHOCYTES NFR BLD AUTO: 11 %
MCH RBC QN AUTO: 30.2 PG (ref 26.6–33)
MCHC RBC AUTO-ENTMCNC: 33.3 G/DL (ref 31.5–35.7)
MCV RBC AUTO: 91 FL (ref 79–97)
MONOCYTES # BLD AUTO: 0.4 X10E3/UL (ref 0.1–0.9)
MONOCYTES NFR BLD AUTO: 4 %
NEUTROPHILS # BLD AUTO: 10.4 X10E3/UL (ref 1.4–7)
NEUTROPHILS NFR BLD AUTO: 85 %
PLATELET # BLD AUTO: 277 X10E3/UL (ref 150–450)
POTASSIUM SERPL-SCNC: 4.8 MMOL/L (ref 3.5–5.2)
PROT SERPL-MCNC: 7.2 G/DL (ref 6–8.5)
RBC # BLD AUTO: 3.94 X10E6/UL (ref 3.77–5.28)
SODIUM SERPL-SCNC: 138 MMOL/L (ref 134–144)
TRIGL SERPL-MCNC: 47 MG/DL (ref 0–149)
VLDLC SERPL CALC-MCNC: 11 MG/DL (ref 5–40)
WBC # BLD AUTO: 12.3 X10E3/UL (ref 3.4–10.8)

## 2023-04-21 RX ORDER — METFORMIN HYDROCHLORIDE 500 MG/1
500 TABLET, EXTENDED RELEASE ORAL 2 TIMES DAILY WITH MEALS
Qty: 180 TABLET | Refills: 1 | Status: SHIPPED | OUTPATIENT
Start: 2023-04-21 | End: 2023-10-18

## 2023-04-25 ENCOUNTER — OFFICE VISIT (OUTPATIENT)
Dept: NEUROSURGERY | Facility: CLINIC | Age: 53
End: 2023-04-25
Payer: COMMERCIAL

## 2023-04-25 VITALS
BODY MASS INDEX: 25.97 KG/M2 | DIASTOLIC BLOOD PRESSURE: 64 MMHG | HEART RATE: 80 BPM | WEIGHT: 142 LBS | SYSTOLIC BLOOD PRESSURE: 108 MMHG | OXYGEN SATURATION: 98 % | TEMPERATURE: 97.5 F

## 2023-04-25 DIAGNOSIS — G89.29 CHRONIC BILATERAL LOW BACK PAIN WITHOUT SCIATICA: Primary | ICD-10-CM

## 2023-04-25 DIAGNOSIS — M54.50 CHRONIC BILATERAL LOW BACK PAIN WITHOUT SCIATICA: Primary | ICD-10-CM

## 2023-04-25 PROCEDURE — 99203 OFFICE O/P NEW LOW 30 MIN: CPT | Performed by: NURSE PRACTITIONER

## 2023-04-25 RX ORDER — METHOCARBAMOL 750 MG/1
TABLET, FILM COATED ORAL
Qty: 45 TABLET | Refills: 1 | Status: SHIPPED | OUTPATIENT
Start: 2023-04-25

## 2023-05-15 ENCOUNTER — TREATMENT (OUTPATIENT)
Dept: PHYSICAL THERAPY | Facility: CLINIC | Age: 53
End: 2023-05-15
Payer: COMMERCIAL

## 2023-05-15 DIAGNOSIS — M54.41 ACUTE RIGHT-SIDED LOW BACK PAIN WITH RIGHT-SIDED SCIATICA: Primary | ICD-10-CM

## 2023-05-15 PROCEDURE — 97110 THERAPEUTIC EXERCISES: CPT | Performed by: PHYSICAL THERAPIST

## 2023-05-15 PROCEDURE — 97161 PT EVAL LOW COMPLEX 20 MIN: CPT | Performed by: PHYSICAL THERAPIST

## 2023-05-15 PROCEDURE — 97530 THERAPEUTIC ACTIVITIES: CPT | Performed by: PHYSICAL THERAPIST

## 2023-05-15 NOTE — PROGRESS NOTES
"Physical Therapy Initial Evaluation and Plan of Care  [address], [address], [place] [address]    Patient: Flora Love   : 1970  Diagnosis/ICD-10 Code:  Acute right-sided low back pain with right-sided sciatica [M54.41]  Referring practitioner: NIDHI Porter    Subjective Evaluation    History of Present Illness  Onset date: acute exacerbation of chronic symptoms.  Mechanism of injury: Patient reports chronic (R) low back/(R) LE pain for years but a worsening of symptoms in the past 3-4 weeks.  She describes symptoms (R) posterolateral hip region, posterior thigh, posterolateral lower leg, and plantar surface of foot.  She also describes intermittent numbness of (R) foot. 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  She presented to ED about one month ago where she received a pain injection, steroid packs, and muscle relaxers.  The pain improved temporarily while taking steroids but returned once these were completed.  She saw Madelyn TERRELL with neurosurgery who recommended PT.      PMH notable for DM 2.       Patient Occupation: Ashley - [employer] Quality of life: good    Pain  Current pain ratin  At best pain ratin  At worst pain rating: 10 (\"just a strong pain\")  Location: (R) posterolateral gluteal region, posterior thigh, posterolateral lower leg, plantar foot  Relieving factors: medications (supine lying with LEs extended)  Aggravating factors: ambulation, movement, squatting, lifting, prolonged positioning and sleeping (prolonged sitting)  Progression: no change    Hand dominance: right    Diagnostic Tests  Abnormal x-ray: spinal stabilization bar from previous scoliosis surgery, slight retrolisthesis L3-4.    Patient Goals  Patient goals for therapy: decreased pain  Patient goal: \"see what I can do to ease the pain\"           Subjective Questionnaire: Oswestry:  = 24%    Objective        Special Questions      Additional Special Questions  Patient denies B/B " changes or dysfunction      Neurological Testing     Sensation     Lumbar   Left   Intact: light touch    Right   Diminished: light touch    Additional Neurological Details  Decreased light touch (R) S1 and S2 dermatomes    Active Range of Motion     Additional Active Range of Motion Details  Lumbar AROM (%):  Flexion 50%, increased (R) hip pain  Extension 25%, increased (R) lumbar pain  Right lateral flexion 10%, (R) lumbar pain and limitation from scoliosis/previous scoliosis surgery  Left lateral flexion 25%, (R) lumbar pain  Right rotation 50%, stiff/tight  Left rotation 50%, (R) lumbar pain    Strength/Myotome Testing     Lumbar   Left   Heel walk: normal  Toe walk: normal    Right   Heel walk: normal  Toe walk: normal    Left Hip   Planes of Motion   Flexion: 4    Right Hip   Planes of Motion   Flexion: 4    Left Knee   Flexion: 4  Extension: 4+    Right Knee   Flexion: 4  Extension: 4+    Left Ankle/Foot   Dorsiflexion: 4+    Right Ankle/Foot   Dorsiflexion: 4+          Assessment & Plan     Assessment  Impairments: abnormal muscle firing, abnormal or restricted ROM, activity intolerance, impaired physical strength, lacks appropriate home exercise program and pain with function  Functional Limitations: carrying objects, lifting, sleeping, walking, pulling, pushing, uncomfortable because of pain, sitting, standing and stooping  Assessment details: Patient is a 52 y.o female who reports acute exacerbation of chronic low back/(R) LE symptoms over the past 3-4 weeks.  She has completed a steroid pack and takes a muscle relaxer.  She reports symptoms 0-10/10 in (R) posterolateral hip/posterior thigh/posterolateral lower leg/plantar foot in addition to intermittent numbness of (R) foot.  She exhibits postural deficits (previous scoliosis surgery at age 14), significantly limited lumbar AROM, and decreased proximal > distal LE strength.  Her Oswestry score is 24% indicating a moderate perceived level of functional  limitation.  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   Prognosis: good    Goals  Plan Goals: STGs: to be met in 6 weeks  1. Patient will be independent with initial HEP  2. Patient will report improved (R) LE symptoms 0-5/10 with no symptoms beyond (R) knee x 3 consecutive days  3. Patient will demonstrate improved lumbar AROM 50-75% all planes for improved mobility and positional tolerance  4. Patient will report consistently sleeping without pain interruption more than once nightly for improved restorative healing    LTGs: to be met in 12 weeks  1. Patient will be independent with progressed HEP  2. Patient will report improved lumbar/(R) gluteal symptoms 0-2/10 with no symptoms beyond gluteal line x 3 consecutive days  3. Patient will demonstrate improved (B) LE strength >/= 4 - 4+/5 for improved functional strength  4.  Patient will report 50-75% improved tolerance to walking, bending, and lifting to facilitate successful performance of work tasks  5. Patient will have improved Oswestry score </= 15% for subjective evidence of functional improvement    Plan  Therapy options: will be seen for skilled therapy services  Planned modality interventions: cryotherapy and thermotherapy (hydrocollator packs)  Planned therapy interventions: ADL retraining, abdominal trunk stabilization, body mechanics training, flexibility, functional ROM exercises, home exercise program, joint mobilization, manual therapy, neuromuscular re-education, postural training, soft tissue mobilization, spinal/joint mobilization, strengthening, stretching and therapeutic activities  Other planned therapy interventions: simulated work tasks  Frequency: 1x week  Duration in weeks: 12  Treatment plan discussed with: patient  Plan details: Patient will be seen once every 2-3 weeks due to high  copay        Manual Therapy:         mins  38599;  Therapeutic Exercise:    19     mins  88608;     Neuromuscular Mirta:        mins  64034;    Therapeutic Activity:     9     mins  46374;     Gait Training:           mins  19086;     Ultrasound:          mins  36378;    Electrical Stimulation:         mins  56319 ( );  Dry Needling          mins self-pay    Timed Treatment:   28  mins   Total Treatment:     49   mins    PT SIGNATURE: Kristi Montelongo PT, DPT, OCS  Electronically signed by: Kristi Montelongo PT, 05/15/23, 10:11 AM EDT  [place] License #[id number]     DATE TREATMENT INITIATED: 5/15/2023    Initial Certification  Certification Period: 5/15/2023 thru 8/12/2023  I certify that the therapy services are furnished while this patient is under my care.  The services outlined above are required by this patient, and will be reviewed every 90 days.     PHYSICIAN: Madelyn Macias, APRN  [id number]                                          DATE:     Please sign and return via fax to [phone]. Thank you, [facility].